# Patient Record
Sex: FEMALE | Race: WHITE | NOT HISPANIC OR LATINO | URBAN - METROPOLITAN AREA
[De-identification: names, ages, dates, MRNs, and addresses within clinical notes are randomized per-mention and may not be internally consistent; named-entity substitution may affect disease eponyms.]

---

## 2018-07-02 ENCOUNTER — EMERGENCY (EMERGENCY)
Facility: HOSPITAL | Age: 33
LOS: 1 days | Discharge: LEFT BEFORE DISCHARGE | End: 2018-07-02
Admitting: EMERGENCY MEDICINE

## 2018-07-02 ENCOUNTER — EMERGENCY (EMERGENCY)
Facility: HOSPITAL | Age: 33
LOS: 1 days | Discharge: HOME | End: 2018-07-02
Attending: EMERGENCY MEDICINE | Admitting: EMERGENCY MEDICINE
Payer: COMMERCIAL

## 2018-07-02 VITALS
SYSTOLIC BLOOD PRESSURE: 134 MMHG | RESPIRATION RATE: 17 BRPM | OXYGEN SATURATION: 99 % | TEMPERATURE: 98 F | HEART RATE: 87 BPM | WEIGHT: 119.93 LBS | DIASTOLIC BLOOD PRESSURE: 85 MMHG

## 2018-07-02 VITALS
SYSTOLIC BLOOD PRESSURE: 103 MMHG | DIASTOLIC BLOOD PRESSURE: 72 MMHG | RESPIRATION RATE: 18 BRPM | HEART RATE: 93 BPM | OXYGEN SATURATION: 98 % | TEMPERATURE: 98 F | WEIGHT: 131.18 LBS

## 2018-07-02 DIAGNOSIS — K08.89 OTHER SPECIFIED DISORDERS OF TEETH AND SUPPORTING STRUCTURES: ICD-10-CM

## 2018-07-02 DIAGNOSIS — R51 HEADACHE: ICD-10-CM

## 2018-07-02 PROCEDURE — 99283 EMERGENCY DEPT VISIT LOW MDM: CPT | Mod: 25

## 2018-07-02 PROCEDURE — 96372 THER/PROPH/DIAG INJ SC/IM: CPT

## 2018-07-02 PROCEDURE — 99283 EMERGENCY DEPT VISIT LOW MDM: CPT

## 2018-07-02 RX ORDER — CHLORHEXIDINE GLUCONATE 213 G/1000ML
15 SOLUTION TOPICAL ONCE
Qty: 0 | Refills: 0 | Status: COMPLETED | OUTPATIENT
Start: 2018-07-02 | End: 2018-07-02

## 2018-07-02 RX ORDER — DICLOFENAC SODIUM 75 MG/1
1 TABLET, DELAYED RELEASE ORAL
Qty: 42 | Refills: 0 | OUTPATIENT
Start: 2018-07-02 | End: 2018-07-15

## 2018-07-02 RX ORDER — KETOROLAC TROMETHAMINE 30 MG/ML
30 SYRINGE (ML) INJECTION ONCE
Qty: 0 | Refills: 0 | Status: DISCONTINUED | OUTPATIENT
Start: 2018-07-02 | End: 2018-07-02

## 2018-07-02 RX ORDER — OXYCODONE AND ACETAMINOPHEN 5; 325 MG/1; MG/1
1 TABLET ORAL ONCE
Qty: 0 | Refills: 0 | Status: DISCONTINUED | OUTPATIENT
Start: 2018-07-02 | End: 2018-07-02

## 2018-07-02 RX ORDER — CHLORHEXIDINE GLUCONATE 213 G/1000ML
30 SOLUTION TOPICAL
Qty: 420 | Refills: 0 | OUTPATIENT
Start: 2018-07-02 | End: 2018-07-08

## 2018-07-02 RX ADMIN — OXYCODONE AND ACETAMINOPHEN 1 TABLET(S): 5; 325 TABLET ORAL at 13:25

## 2018-07-02 RX ADMIN — Medication 30 MILLIGRAM(S): at 13:40

## 2018-07-02 RX ADMIN — CHLORHEXIDINE GLUCONATE 15 MILLILITER(S): 213 SOLUTION TOPICAL at 13:43

## 2018-07-02 NOTE — ED PROVIDER NOTE - MEDICAL DECISION MAKING DETAILS
32 y/o female with right sided upper tooth pain. neuro intact. pain treated with significant improved. advised fu with dental/oral surgeon/neuro. with percussion tenderness - likely pain from tooth. recent xray from dental - do not suspect tooth abscess given no hx of dental work and good oral hygiene.

## 2018-07-02 NOTE — ED ADULT NURSE NOTE - OBJECTIVE STATEMENT
34 y/o female w/ no PMHx presents to ED c/o right sided facial pain that began one week ago. Pt was evaluated by dentist who sent pt to ED to see neurologist  for possible trigeminal neuralgia. Denies fever, chills, numbness, tingling, weakness, headache, dizziness, and any other associated symptoms.

## 2018-07-02 NOTE — ED PROVIDER NOTE - NEUROLOGICAL, MLM
Alert and oriented, no focal deficits, no motor or sensory deficits. CN II - XII intact. No bells palsy, nml cerebellar exam

## 2018-07-02 NOTE — ED ADULT TRIAGE NOTE - CHIEF COMPLAINT QUOTE
" I have facial pain for the past week, I thought It was my teeth, went to the dentist and they told me that It might be my facial nerves.

## 2018-07-02 NOTE — ED PROVIDER NOTE - OBJECTIVE STATEMENT
34 y/o female with no PMHx is present with right tooth pain since Monday. Pt states that she saw her dentist twice, no findings and was given motrin and flex without relief of her pain. she describes the top root of her tooth being painful and increases with biting and opening/closing her mouth. Pt denies the following: redness, discharge, smoking hx, painful swallowing, difficulty swallowing, numbness/tingling of the face, confusion, headaches.

## 2021-08-26 NOTE — ED ADULT NURSE NOTE - INTEGUMENTARY WDL
Color consistent with ethnicity/race, warm, dry intact, resilient.
CONST: NAD  EYES: R lateral nystagmus, EOMI, Sclera and conjunctiva clear.   ENT: No nasal discharge. Oropharynx normal appearing, no erythema or exudates. No abscess or swelling. Uvula midline.   NECK: Non-tender, no meningeal signs. normal ROM. supple   CARD: S1 S2; No jvd  RESP: Equal BS B/L, No wheezes, rhonchi or rales. No distress  GI: Soft, non-tender, non-distended. no cva tenderness. normal BS  MS: Normal ROM in all extremities. pulses 2 +. no calf tenderness or swelling  SKIN: Warm, dry, no acute rashes. Good turgor  NEURO: A&Ox3, No focal deficits. Strength 5/5 with no sensory deficits. Finger to nose intact. Negative pronator drift